# Patient Record
Sex: MALE | Race: WHITE | NOT HISPANIC OR LATINO | URBAN - METROPOLITAN AREA
[De-identification: names, ages, dates, MRNs, and addresses within clinical notes are randomized per-mention and may not be internally consistent; named-entity substitution may affect disease eponyms.]

---

## 2023-03-14 ENCOUNTER — WEB ENCOUNTER (OUTPATIENT)
Dept: URBAN - METROPOLITAN AREA CLINIC 7 | Facility: CLINIC | Age: 23
End: 2023-03-14

## 2023-03-14 ENCOUNTER — LAB OUTSIDE AN ENCOUNTER (OUTPATIENT)
Dept: URBAN - METROPOLITAN AREA CLINIC 7 | Facility: CLINIC | Age: 23
End: 2023-03-14

## 2023-03-14 ENCOUNTER — DASHBOARD ENCOUNTERS (OUTPATIENT)
Age: 23
End: 2023-03-14

## 2023-03-14 ENCOUNTER — OFFICE VISIT (OUTPATIENT)
Dept: URBAN - METROPOLITAN AREA CLINIC 7 | Facility: CLINIC | Age: 23
End: 2023-03-14
Payer: COMMERCIAL

## 2023-03-14 ENCOUNTER — TELEPHONE ENCOUNTER (OUTPATIENT)
Dept: URBAN - METROPOLITAN AREA CLINIC 7 | Facility: CLINIC | Age: 23
End: 2023-03-14

## 2023-03-14 VITALS
TEMPERATURE: 98 F | WEIGHT: 110 LBS | BODY MASS INDEX: 16.29 KG/M2 | DIASTOLIC BLOOD PRESSURE: 56 MMHG | HEIGHT: 69 IN | SYSTOLIC BLOOD PRESSURE: 90 MMHG

## 2023-03-14 DIAGNOSIS — R68.81 EARLY SATIETY: ICD-10-CM

## 2023-03-14 DIAGNOSIS — K21.9 GERD WITHOUT ESOPHAGITIS: ICD-10-CM

## 2023-03-14 DIAGNOSIS — R11.10 VOMITING, UNSPECIFIED VOMITING TYPE, UNSPECIFIED WHETHER NAUSEA PRESENT: ICD-10-CM

## 2023-03-14 DIAGNOSIS — R63.4 WEIGHT LOSS: ICD-10-CM

## 2023-03-14 DIAGNOSIS — R10.13 EPIGASTRIC PAIN: ICD-10-CM

## 2023-03-14 DIAGNOSIS — K82.4 GALLBLADDER POLYP: ICD-10-CM

## 2023-03-14 DIAGNOSIS — R13.14 PHARYNGOESOPHAGEAL DYSPHAGIA: ICD-10-CM

## 2023-03-14 PROBLEM — 40739000: Status: ACTIVE | Noted: 2023-03-14

## 2023-03-14 PROBLEM — 422400008: Status: ACTIVE | Noted: 2023-03-14

## 2023-03-14 PROBLEM — 197433003: Status: ACTIVE | Noted: 2023-03-14

## 2023-03-14 PROBLEM — 89362005: Status: ACTIVE | Noted: 2023-03-14

## 2023-03-14 PROBLEM — 442076002: Status: ACTIVE | Noted: 2023-03-14

## 2023-03-14 PROBLEM — 79922009: Status: ACTIVE | Noted: 2023-03-14

## 2023-03-14 PROBLEM — 266435005: Status: ACTIVE | Noted: 2023-03-14

## 2023-03-14 PROCEDURE — 99204 OFFICE O/P NEW MOD 45 MIN: CPT | Performed by: INTERNAL MEDICINE

## 2023-03-14 RX ORDER — METHYLPHENIDATE HYDROCHLORIDE 18 MG/1
1 TABLET IN THE MORNING TABLET, EXTENDED RELEASE ORAL ONCE A DAY
Status: ACTIVE | COMMUNITY

## 2023-03-14 RX ORDER — FAMOTIDINE 40 MG/1
TABLET, FILM COATED ORAL
Qty: 180 TABLET | Refills: 1 | Status: ACTIVE | COMMUNITY

## 2023-03-14 NOTE — HPI-TODAY'S VISIT:
Patient is seen today with a complaint of postprandial discomfort ,feeling of regurgitation and may ultimatly have emesis. Emesis will often be liquid but at times is undigested food material. No erutucation Will get intermittent  GERD symptoms . Associated with early satiety and dyspepsia. Taking antacids/tums prn. No fevers or chills No nausea or vomiting No dark urine or acholic stools No change in bowel habits Aprox 10lb weight loss x last 6 months. Eval incuded a US with sludge and gallbladder polyps.

## 2023-03-15 PROBLEM — 235631007 CRICOPHARYNGEAL DISORDER: Status: ACTIVE | Noted: 2023-03-15

## 2023-03-20 ENCOUNTER — CLAIMS CREATED FROM THE CLAIM WINDOW (OUTPATIENT)
Dept: URBAN - METROPOLITAN AREA SURGERY CENTER 5 | Facility: SURGERY CENTER | Age: 23
End: 2023-03-20
Payer: COMMERCIAL

## 2023-03-20 DIAGNOSIS — R10.13 ABDOMINAL DISCOMFORT, EPIGASTRIC: ICD-10-CM

## 2023-03-20 DIAGNOSIS — R13.10 ABNORMAL DEGLUTITION: ICD-10-CM

## 2023-03-20 DIAGNOSIS — R11.2 ACUTE NAUSEA WITH NONBILIOUS VOMITING: ICD-10-CM

## 2023-03-20 PROCEDURE — 43239 EGD BIOPSY SINGLE/MULTIPLE: CPT | Performed by: INTERNAL MEDICINE

## 2023-03-20 PROCEDURE — 43248 EGD GUIDE WIRE INSERTION: CPT | Performed by: INTERNAL MEDICINE

## 2023-06-09 ENCOUNTER — LAB OUTSIDE AN ENCOUNTER (OUTPATIENT)
Dept: URBAN - METROPOLITAN AREA CLINIC 7 | Facility: CLINIC | Age: 23
End: 2023-06-09

## 2023-08-08 ENCOUNTER — WEB ENCOUNTER (OUTPATIENT)
Dept: URBAN - METROPOLITAN AREA TELEHEALTH 1 | Facility: TELEHEALTH | Age: 23
End: 2023-08-08

## 2023-12-19 ENCOUNTER — TELEPHONE ENCOUNTER (OUTPATIENT)
Dept: URBAN - METROPOLITAN AREA CLINIC 7 | Facility: CLINIC | Age: 23
End: 2023-12-19